# Patient Record
Sex: FEMALE | Race: WHITE | NOT HISPANIC OR LATINO | Employment: OTHER | RURAL
[De-identification: names, ages, dates, MRNs, and addresses within clinical notes are randomized per-mention and may not be internally consistent; named-entity substitution may affect disease eponyms.]

---

## 2018-07-25 ENCOUNTER — OFFICE VISIT (OUTPATIENT)
Dept: CARDIAC SURGERY | Facility: CLINIC | Age: 73
End: 2018-07-25

## 2018-07-25 VITALS
BODY MASS INDEX: 29.59 KG/M2 | DIASTOLIC BLOOD PRESSURE: 101 MMHG | HEART RATE: 75 BPM | WEIGHT: 167 LBS | HEIGHT: 63 IN | SYSTOLIC BLOOD PRESSURE: 160 MMHG

## 2018-07-25 DIAGNOSIS — R91.8 LUNG NODULES: Primary | ICD-10-CM

## 2018-07-25 PROCEDURE — 99204 OFFICE O/P NEW MOD 45 MIN: CPT | Performed by: THORACIC SURGERY (CARDIOTHORACIC VASCULAR SURGERY)

## 2018-07-25 RX ORDER — LINAGLIPTIN 5 MG/1
1 TABLET, FILM COATED ORAL DAILY
Refills: 5 | COMMUNITY
Start: 2018-06-27

## 2018-07-25 RX ORDER — GABAPENTIN 600 MG/1
4 TABLET ORAL DAILY
COMMUNITY
Start: 2018-06-27

## 2018-07-25 RX ORDER — LEVOTHYROXINE SODIUM 0.05 MG/1
1 TABLET ORAL DAILY
COMMUNITY
Start: 2018-06-27

## 2018-07-25 RX ORDER — LOSARTAN POTASSIUM 100 MG/1
1 TABLET ORAL DAILY
COMMUNITY
Start: 2018-06-25

## 2018-07-25 RX ORDER — INSULIN DETEMIR 100 [IU]/ML
40 INJECTION, SOLUTION SUBCUTANEOUS 2 TIMES DAILY
Refills: 2 | COMMUNITY
Start: 2018-05-31

## 2018-07-25 RX ORDER — SPIRONOLACTONE 100 MG/1
1 TABLET, FILM COATED ORAL DAILY
COMMUNITY
Start: 2018-06-27

## 2018-07-25 RX ORDER — METOPROLOL SUCCINATE 100 MG/1
1 TABLET, EXTENDED RELEASE ORAL DAILY
COMMUNITY
Start: 2018-06-27

## 2018-07-25 NOTE — PROGRESS NOTES
07/25/2018  Patient Information  Destiny Steen                                                                                          [address]  [facility] [address]   1945  'PCP/Referring Physician'  Ramya Florez  [phone]  Ramya Florez APRN  [phone]  Chief Complaint   Patient presents with   • Lung Nodule     Referred by RADHA Osullivan for a pulmonary nodule       History of Present Illness:  The patient is a 72-year-old female who is referred at this time for evaluation of her right lower lobe nodules ×2.  One is 11 mm and one is 12 mm.  The one has minimal calcium.  She had a PET scan a year ago which was PET negative.  She is not a smoker.  She denies any weight loss of any significance.  She has had no exposure to industrial carcinogens or infectious disease.  Patient Active Problem List   Diagnosis   • Lung nodules     Past Medical History:   Diagnosis Date   • Arthritis    • CAD (coronary artery disease)    • Diabetes mellitus type II, controlled (CMS/HCC)    • HTN (hypertension)      Past Surgical History:   Procedure Laterality Date   • CATARACT EXTRACTION     • PERIPHERAL ARTERIAL STENT GRAFT     • TUBAL ABDOMINAL LIGATION         Current Outpatient Prescriptions:   •  aspirin 81 MG tablet, Take 81 mg by mouth Daily., Disp: , Rfl:   •  gabapentin (NEURONTIN) 600 MG tablet, Take 4 tablets by mouth Daily., Disp: , Rfl:   •  LEVEMIR FLEXTOUCH 100 UNIT/ML injection, , Disp: , Rfl: 2  •  levothyroxine (SYNTHROID, LEVOTHROID) 50 MCG tablet, Take 1 tablet by mouth Daily., Disp: , Rfl:   •  losartan (COZAAR) 100 MG tablet, Take 1 tablet by mouth Daily., Disp: , Rfl:   •  metFORMIN (GLUCOPHAGE) 1000 MG tablet, Take 1 tablet by mouth 2 (Two) Times a Day., Disp: , Rfl:   •  metoprolol succinate XL (TOPROL-XL) 100 MG 24 hr tablet, Take 1 tablet by mouth Daily., Disp: , Rfl:   •  Multiple Vitamin (MULTI-VITAMIN DAILY PO), Take  by mouth., Disp: , Rfl:   •  Multiple  Vitamins-Minerals (ICAPS AREDS 2 PO), Take  by mouth., Disp: , Rfl:   •  Omega-3 Fatty Acids (FISH OIL) 1200 MG capsule delayed-release, Take  by mouth., Disp: , Rfl:   •  spironolactone (ALDACTONE) 100 MG tablet, Take 1 tablet by mouth Daily., Disp: , Rfl:   •  TRADJENTA 5 MG tablet tablet, Take 1 tablet by mouth Daily., Disp: , Rfl: 5  Allergies   Allergen Reactions   • Lipitor [Atorvastatin] Delirium     Social History     Social History   • Marital status:      Spouse name: N/A   • Number of children: 3   • Years of education: N/A     Occupational History   • [employer] Retired     Social History Main Topics   • Smoking status: Never Smoker   • Smokeless tobacco: Never Used   • Alcohol use No   • Drug use: No   • Sexual activity: Not on file     Other Topics Concern   • Not on file     Social History Narrative    Lives with spouse in [place], [place]      Family History   Problem Relation Age of Onset   • Seizures Mother    • Colon cancer Father      Review of Systems   Constitution: Negative for chills, fever, malaise/fatigue, night sweats and weight loss.   HENT: Negative for hearing loss, odynophagia and sore throat.    Cardiovascular: Positive for dyspnea on exertion. Negative for chest pain, leg swelling, orthopnea and palpitations.   Respiratory: Negative for cough and hemoptysis.    Endocrine: Negative for cold intolerance, heat intolerance, polydipsia, polyphagia and polyuria.   Hematologic/Lymphatic: Does not bruise/bleed easily.   Skin: Negative for itching and rash.   Musculoskeletal: Positive for joint pain. Negative for joint swelling and myalgias.   Gastrointestinal: Negative for abdominal pain, constipation, diarrhea, hematemesis, hematochezia, melena, nausea and vomiting.   Genitourinary: Positive for nocturia and urgency. Negative for dysuria, frequency and hematuria.   Neurological: Positive for dizziness and loss of balance. Negative for focal weakness, headaches, numbness  "and seizures.   Psychiatric/Behavioral: Negative for suicidal ideas.   All other systems reviewed and are negative.    Vitals:    07/25/18 0758   BP: (!) 160/101   BP Location: Left arm   Patient Position: Sitting   Pulse: 75   Weight: 75.8 kg (167 lb)   Height: 160 cm (63\")      Physical Exam   Constitutional: She is oriented to person, place, and time. She appears well-developed and well-nourished. No distress.   HENT:   Head: Normocephalic.   Eyes: Pupils are equal, round, and reactive to light. EOM are normal.   Neck: Normal range of motion. Carotid bruit is not present. No thyromegaly present.   Cardiovascular: Normal rate and regular rhythm.  Exam reveals no gallop and no friction rub.    No murmur heard.  Pulmonary/Chest: She has no wheezes. She has no rales.   Abdominal: Soft. Bowel sounds are normal. She exhibits no distension and no mass. There is no hepatomegaly. There is no tenderness.   Musculoskeletal: Normal range of motion. She exhibits no deformity.   Neurological: She is alert and oriented to person, place, and time. She has normal strength. No cranial nerve deficit or sensory deficit.   Skin: No bruising and no petechiae noted. No cyanosis. Nails show no clubbing.   Psychiatric: She has a normal mood and affect.       Labs/Imaging:  I obtained and reviewed medical records from Ms. Florez's office including the CT scan.  She appears to have 2 small nodules in the right lower lobe.     Assessment/Plan:    The patient is a 72-year-old female who is referred for evaluation of lung nodules.  I will obtain her PET scan records as well.  This appears to have been negative only one year ago.  At this point, in view of her history, I would continue to observe her very carefully.  We will see her back in 6 months with a CT scan.  She will call me if she has any pulmonary symptoms or other problems before then.    Patient Active Problem List   Diagnosis   • Lung nodules     CC: Ramya Florez, " RADHA Villaseñor, , editing for Neville Crump M.D.    I, Neville Crump MD, have read and agree with the editing done by Lety Villaseñor, .

## 2018-07-26 PROBLEM — R91.8 LUNG NODULES: Status: ACTIVE | Noted: 2018-07-26

## 2019-01-08 ENCOUNTER — TELEPHONE (OUTPATIENT)
Dept: CARDIAC SURGERY | Facility: CLINIC | Age: 74
End: 2019-01-08

## 2019-01-08 DIAGNOSIS — R91.8 LUNG NODULES: Primary | ICD-10-CM

## 2019-01-08 NOTE — TELEPHONE ENCOUNTER
PT RECEIVED RECALL LETTER PER [facility] IN [facility] FOR TEST AND F/U, PT VERIFIED PHONE NUMBER, ADDRESS, AND INSURANCE

## 2019-03-27 ENCOUNTER — OFFICE VISIT (OUTPATIENT)
Dept: CARDIAC SURGERY | Facility: CLINIC | Age: 74
End: 2019-03-27

## 2019-03-27 VITALS
DIASTOLIC BLOOD PRESSURE: 80 MMHG | BODY MASS INDEX: 31.28 KG/M2 | SYSTOLIC BLOOD PRESSURE: 173 MMHG | WEIGHT: 170 LBS | HEIGHT: 62 IN | HEART RATE: 76 BPM

## 2019-03-27 DIAGNOSIS — R91.8 LUNG NODULES: Primary | ICD-10-CM

## 2019-03-27 PROCEDURE — 99212 OFFICE O/P EST SF 10 MIN: CPT | Performed by: THORACIC SURGERY (CARDIOTHORACIC VASCULAR SURGERY)

## 2019-03-27 RX ORDER — AMLODIPINE BESYLATE 10 MG/1
10 TABLET ORAL DAILY
Refills: 5 | COMMUNITY
Start: 2019-03-07 | End: 2021-09-21 | Stop reason: ALTCHOICE

## 2019-03-27 NOTE — PROGRESS NOTES
03/27/2019  Patient Information  Destiny Steen                                                                                          [address]  [facility] [address]   1945  'PCP/Referring Physician'  Florez, Ramya  [phone]  No ref. provider found    Chief Complaint   Patient presents with   • Follow-up     6 month follow up to discuss CT chest results for a lung nodule.   • Lung Nodule       History of Present Illness:   The patient returns today for follow-up of her lung nodule.  Since last visit, she has done well.  She has had no cough, hemoptysis, or pulmonary symptoms.  She did get her CT scan done.  She is not smoking.      Patient Active Problem List   Diagnosis   • Lung nodules     Past Medical History:   Diagnosis Date   • Arthritis    • CAD (coronary artery disease)    • Diabetes mellitus type II, controlled (CMS/HCC)    • HTN (hypertension)      Past Surgical History:   Procedure Laterality Date   • CATARACT EXTRACTION     • PERIPHERAL ARTERIAL STENT GRAFT     • TUBAL ABDOMINAL LIGATION         Current Outpatient Medications:   •  aspirin 81 MG tablet, Take 81 mg by mouth Daily., Disp: , Rfl:   •  gabapentin (NEURONTIN) 600 MG tablet, Take 4 tablets by mouth Daily., Disp: , Rfl:   •  LEVEMIR FLEXTOUCH 100 UNIT/ML injection, , Disp: , Rfl: 2  •  levothyroxine (SYNTHROID, LEVOTHROID) 50 MCG tablet, Take 1 tablet by mouth Daily., Disp: , Rfl:   •  metoprolol succinate XL (TOPROL-XL) 100 MG 24 hr tablet, Take 1 tablet by mouth Daily., Disp: , Rfl:   •  Multiple Vitamin (MULTI-VITAMIN DAILY PO), Take  by mouth., Disp: , Rfl:   •  Multiple Vitamins-Minerals (ICAPS AREDS 2 PO), Take  by mouth., Disp: , Rfl:   •  Omega-3 Fatty Acids (FISH OIL) 1200 MG capsule delayed-release, Take  by mouth., Disp: , Rfl:   •  spironolactone (ALDACTONE) 100 MG tablet, Take 1 tablet by mouth Daily., Disp: , Rfl:   •  TRADJENTA 5 MG tablet tablet, Take 1 tablet by mouth Daily., Disp: , Rfl: 5  •  amLODIPine  (NORVASC) 10 MG tablet, Take 10 mg by mouth Daily., Disp: , Rfl: 5  •  losartan (COZAAR) 100 MG tablet, Take 1 tablet by mouth Daily., Disp: , Rfl:   •  metFORMIN (GLUCOPHAGE) 1000 MG tablet, Take 1 tablet by mouth 2 (Two) Times a Day., Disp: , Rfl:   Allergies   Allergen Reactions   • Lipitor [Atorvastatin] Delirium     Social History     Socioeconomic History   • Marital status:      Spouse name: Not on file   • Number of children: 3   • Years of education: Not on file   • Highest education level: Not on file   Occupational History   • 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   HENT: Negative for hearing loss, odynophagia and sore throat.    Cardiovascular: Negative for chest pain, dyspnea on exertion, leg swelling, orthopnea and palpitations.   Respiratory: Negative for cough and hemoptysis.    Endocrine: Negative for cold intolerance, heat intolerance, polydipsia, polyphagia and polyuria.   Hematologic/Lymphatic: Does not bruise/bleed easily.   Skin: Negative for itching and rash.   Musculoskeletal: Positive for back pain and joint pain. Negative for joint swelling and myalgias.   Gastrointestinal: Negative for abdominal pain, constipation, diarrhea, hematemesis, hematochezia, melena, nausea and vomiting.   Genitourinary: Negative for dysuria, frequency and hematuria.   Neurological: Negative for focal weakness, headaches, numbness and seizures.   Psychiatric/Behavioral: Negative for suicidal ideas.   All other systems reviewed and are negative.    Vitals:    03/27/19  "0936   BP: 173/80   BP Location: Right arm   Patient Position: Sitting   Pulse: 76   Weight: 77.1 kg (170 lb)   Height: 157.5 cm (62\")      Physical Exam   Neck: Normal range of motion. Neck supple. No JVD present. No tracheal deviation present. No thyromegaly present.   Cardiovascular: Normal rate and regular rhythm. Exam reveals no gallop and no friction rub.   No murmur heard.  Pulmonary/Chest: No stridor. No respiratory distress. She has no wheezes. She has no rales. She exhibits no tenderness.   Abdominal: Soft. Bowel sounds are normal. There is no tenderness.   Lymphadenopathy:     She has no cervical adenopathy.     Assessment/Plan:   The patient appears to be doing overall satisfactory.  I have obtained and reviewed her CT scan.  It appears to be stable.  We will see her back in 6 months as recommended by the radiologist with another CT.    Patient Active Problem List   Diagnosis   • Lung nodules     CC: RADHA Osullivan, , editing for Neville Crump M.D.    I, Neville Crump MD, have read and agree with the editing done by Lety Villaseñor, .  "

## 2019-09-26 ENCOUNTER — TELEPHONE (OUTPATIENT)
Dept: CARDIAC SURGERY | Facility: CLINIC | Age: 74
End: 2019-09-26

## 2020-04-27 ENCOUNTER — TELEPHONE (OUTPATIENT)
Dept: CARDIAC SURGERY | Facility: CLINIC | Age: 75
End: 2020-04-27

## 2020-04-27 NOTE — TELEPHONE ENCOUNTER
PT CALLING REGARDING A 6 MONTH F/U IN [facility]. PT STATED SHE RECEIVED HER LETTER A LONG WHILE AGO.

## 2020-05-04 DIAGNOSIS — R91.1 LUNG NODULE: Primary | ICD-10-CM

## 2020-05-08 ENCOUNTER — TELEPHONE (OUTPATIENT)
Dept: CARDIAC SURGERY | Facility: CLINIC | Age: 75
End: 2020-05-08

## 2020-05-08 NOTE — TELEPHONE ENCOUNTER
Pt called regarding 6m F/u with Dr. Crump that should have been in September of 2019 but she never called our office after receiving her recall letter. I call her back today to get her test and apt scheduled with Dr. Crump. I let her know that at this time we are not going to our [facility] clinic so she would have to come to [place] for her F/U but I could schedule her test at [facility]. She does not want to come to our office here in [place] and really does not want to have this test done right now. She also stated she doesn't feel she needs to F/U with Dr. Crump at this time. I let her know that if she changes her mind to please call us back.

## 2021-09-21 ENCOUNTER — OFFICE VISIT (OUTPATIENT)
Dept: ENDOCRINOLOGY | Facility: CLINIC | Age: 76
End: 2021-09-21

## 2021-09-21 VITALS
OXYGEN SATURATION: 94 % | SYSTOLIC BLOOD PRESSURE: 142 MMHG | DIASTOLIC BLOOD PRESSURE: 78 MMHG | HEIGHT: 62 IN | HEART RATE: 70 BPM | BODY MASS INDEX: 39.38 KG/M2 | WEIGHT: 214 LBS

## 2021-09-21 DIAGNOSIS — E11.65 UNCONTROLLED TYPE 2 DIABETES MELLITUS WITH HYPERGLYCEMIA (HCC): Primary | ICD-10-CM

## 2021-09-21 PROCEDURE — 99203 OFFICE O/P NEW LOW 30 MIN: CPT | Performed by: INTERNAL MEDICINE

## 2021-09-21 RX ORDER — HYDRALAZINE HYDROCHLORIDE 50 MG/1
100 TABLET, FILM COATED ORAL 3 TIMES DAILY
COMMUNITY

## 2021-09-21 NOTE — PROGRESS NOTES
"     Office Note      Date: 2021  Patient Name: Destiny Steen  MRN: [id number]  : 1945    Chief Complaint   Patient presents with   • Diabetes       History of Present Illness:   Destiny Steen is a 75 y.o. female who presents for Diabetes - type 2.  On insulin and tradjenta due to ckd with egfr of 35.  She had been taking much higher doses of insulin but had to cut back to 40 units bid about 3 weeks ago due to severe hypoglycemia. Since then her blood sugars have been in the mid 100's.          Subjective      Diabetic Complications:  Eyes: No  Kidneys: Yes, describe: stage 3 skd  Feet: Yes, describe: has neuropathy and vascular disease   Heart: Yes, describe: stroke and pvd    Diet and Exercise:  Meals per day: 1  Minutes of exercise per week: 90 mins.    Review of Systems:   Review of Systems   Constitutional: Positive for activity change and appetite change.   HENT: Negative for trouble swallowing.    Eyes: Positive for visual disturbance.   Respiratory: Negative.    Cardiovascular: Negative.    Musculoskeletal: Positive for myalgias.   Neurological: Positive for weakness.   Hematological: Negative.        The following portions of the patient's history were reviewed and updated as appropriate: allergies, current medications, past family history, past medical history, past social history, past surgical history and problem list.    Objective     Visit Vitals  /78 (BP Location: Left arm, Patient Position: Sitting, Cuff Size: Adult)   Pulse 70   Ht 157.5 cm (62\")   Wt 97.1 kg (214 lb)   SpO2 94%   BMI 39.14 kg/m²       Labs:      Physical Exam:  Physical Exam  Vitals reviewed.   Constitutional:       Appearance: Normal appearance.   Eyes:      Extraocular Movements: Extraocular movements intact.   Neck:      Comments: No goiter  Cardiovascular:      Rate and Rhythm: Normal rate and regular rhythm.   Pulmonary:      Effort: Pulmonary effort is normal. No respiratory distress.   Lymphadenopathy:      " Cervical: No cervical adenopathy.   Skin:     General: Skin is warm.   Neurological:      Mental Status: She is alert.   Psychiatric:         Mood and Affect: Mood normal.         Thought Content: Thought content normal.         Judgment: Judgment normal.         Assessment / Plan      Assessment & Plan:  Problem List Items Addressed This Visit        Other    Uncontrolled type 2 diabetes mellitus with hyperglycemia (CMS/HCC) - Primary    Current Assessment & Plan     Currently on 40 units bid and tradjenta, her a1c is at goal  Her home readings are generally in the mid 100s with no severe lows while on these doses.  She is getting exercise through PT and has a reasonable meal plan.  I recommend she say on the current doses . No changes are needed.          Relevant Medications    LEVEMIR FLEXTOUCH 100 UNIT/ML injection    TRADJENTA 5 MG tablet tablet           Michael Kunz MD   09/21/2021

## 2021-09-21 NOTE — ASSESSMENT & PLAN NOTE
Currently on 40 units bid and tradjenta, her a1c is at goal  Her home readings are generally in the mid 100s with no severe lows while on these doses.  She is getting exercise through PT and has a reasonable meal plan.  I recommend she say on the current doses . No changes are needed.